# Patient Record
Sex: MALE | Race: WHITE
[De-identification: names, ages, dates, MRNs, and addresses within clinical notes are randomized per-mention and may not be internally consistent; named-entity substitution may affect disease eponyms.]

---

## 2019-12-04 ENCOUNTER — HOSPITAL ENCOUNTER (EMERGENCY)
Dept: HOSPITAL 62 - ER | Age: 19
LOS: 1 days | Discharge: HOME | End: 2019-12-05
Payer: COMMERCIAL

## 2019-12-04 DIAGNOSIS — Z79.899: ICD-10-CM

## 2019-12-04 DIAGNOSIS — F15.10: ICD-10-CM

## 2019-12-04 DIAGNOSIS — T40.1X1A: Primary | ICD-10-CM

## 2019-12-04 DIAGNOSIS — F17.200: ICD-10-CM

## 2019-12-04 PROCEDURE — 36415 COLL VENOUS BLD VENIPUNCTURE: CPT

## 2019-12-04 PROCEDURE — 81001 URINALYSIS AUTO W/SCOPE: CPT

## 2019-12-04 PROCEDURE — 82550 ASSAY OF CK (CPK): CPT

## 2019-12-04 PROCEDURE — 84484 ASSAY OF TROPONIN QUANT: CPT

## 2019-12-04 PROCEDURE — 85025 COMPLETE CBC W/AUTO DIFF WBC: CPT

## 2019-12-04 PROCEDURE — 82553 CREATINE MB FRACTION: CPT

## 2019-12-04 PROCEDURE — 93005 ELECTROCARDIOGRAM TRACING: CPT

## 2019-12-04 PROCEDURE — 99284 EMERGENCY DEPT VISIT MOD MDM: CPT

## 2019-12-04 PROCEDURE — 80053 COMPREHEN METABOLIC PANEL: CPT

## 2019-12-04 PROCEDURE — 93010 ELECTROCARDIOGRAM REPORT: CPT

## 2019-12-05 VITALS — SYSTOLIC BLOOD PRESSURE: 140 MMHG | DIASTOLIC BLOOD PRESSURE: 88 MMHG

## 2019-12-05 LAB
ADD MANUAL DIFF: NO
ALBUMIN SERPL-MCNC: 4.6 G/DL (ref 3.5–5)
ALP SERPL-CCNC: 69 U/L (ref 38–126)
ANION GAP SERPL CALC-SCNC: 10 MMOL/L (ref 5–19)
APPEARANCE UR: (no result)
APTT PPP: YELLOW S
AST SERPL-CCNC: 50 U/L (ref 17–59)
BASOPHILS # BLD AUTO: 0 10^3/UL (ref 0–0.2)
BASOPHILS NFR BLD AUTO: 0.4 % (ref 0–2)
BILIRUB DIRECT SERPL-MCNC: 0.1 MG/DL (ref 0–0.4)
BILIRUB SERPL-MCNC: 0.9 MG/DL (ref 0.2–1.3)
BILIRUB UR QL STRIP: NEGATIVE
BUN SERPL-MCNC: 16 MG/DL (ref 7–20)
CALCIUM: 9.2 MG/DL (ref 8.4–10.2)
CHLORIDE SERPL-SCNC: 102 MMOL/L (ref 98–107)
CK MB SERPL-MCNC: 1.97 NG/ML (ref ?–4.55)
CK SERPL-CCNC: 316 U/L (ref 55–170)
CO2 SERPL-SCNC: 28 MMOL/L (ref 22–30)
EOSINOPHIL # BLD AUTO: 0.1 10^3/UL (ref 0–0.6)
EOSINOPHIL NFR BLD AUTO: 1.3 % (ref 0–6)
ERYTHROCYTE [DISTWIDTH] IN BLOOD BY AUTOMATED COUNT: 12.8 % (ref 11.5–14)
GLUCOSE SERPL-MCNC: 76 MG/DL (ref 75–110)
GLUCOSE UR STRIP-MCNC: NEGATIVE MG/DL
HCT VFR BLD CALC: 43.8 % (ref 37.9–51)
HGB BLD-MCNC: 14.9 G/DL (ref 13.5–17)
KETONES UR STRIP-MCNC: NEGATIVE MG/DL
LYMPHOCYTES # BLD AUTO: 2.4 10^3/UL (ref 0.5–4.7)
LYMPHOCYTES NFR BLD AUTO: 24 % (ref 13–45)
MCH RBC QN AUTO: 30.8 PG (ref 27–33.4)
MCHC RBC AUTO-ENTMCNC: 34.1 G/DL (ref 32–36)
MCV RBC AUTO: 90 FL (ref 80–97)
MONOCYTES # BLD AUTO: 0.8 10^3/UL (ref 0.1–1.4)
MONOCYTES NFR BLD AUTO: 8.5 % (ref 3–13)
NEUTROPHILS # BLD AUTO: 6.4 10^3/UL (ref 1.7–8.2)
NEUTS SEG NFR BLD AUTO: 65.8 % (ref 42–78)
NITRITE UR QL STRIP: NEGATIVE
PH UR STRIP: 5 [PH] (ref 5–9)
PLATELET # BLD: 215 10^3/UL (ref 150–450)
POTASSIUM SERPL-SCNC: 4.5 MMOL/L (ref 3.6–5)
PROT SERPL-MCNC: 7.4 G/DL (ref 6.3–8.2)
PROT UR STRIP-MCNC: 30 MG/DL
RBC # BLD AUTO: 4.85 10^6/UL (ref 4.35–5.55)
SP GR UR STRIP: 1.01
TOTAL CELLS COUNTED % (AUTO): 100 %
TROPONIN I SERPL-MCNC: < 0.012 NG/ML
UROBILINOGEN UR-MCNC: NEGATIVE MG/DL (ref ?–2)
WBC # BLD AUTO: 9.8 10^3/UL (ref 4–10.5)

## 2019-12-05 NOTE — ER DOCUMENT REPORT
ED Substance Abuse / Acc. OD





- General


Chief Complaint: Overdose


Stated Complaint: OVERDOSE


Time Seen by Provider: 12/05/19 00:05


Primary Care Provider: 


MARY MENDOZA MD [Primary Care Provider] - Follow up as needed


Notes: 





Patient is a 19-year-old male who was brought in by EMS after being given 2.4 mg

of Narcan to get him breathing and awake again.  Patient states that he did 

methamphetamines and also did a line of heroin.  States that he has been trying 

to stop lately.  He is not suicidal or homicidal.  He does not remember what 

happened except for waking up in an ambulance.  He denies any pain and states 

that he would like to leave as he has to work in the morning.


TRAVEL OUTSIDE OF THE [place] IN LAST 30 DAYS: No





- HPI


Patient complains to provider of: Accidental overdose, Substance abuse


Onset: Just prior to arrival


Quality of pain: No pain


Severity: None


Pain Level: Denies


Overdose of: Other - Heroin





- Related Data


Home Medications: XANAX.  WELLBUTRIN.  ZOLOFT





Past Medical History





- Social History


Smoking Status: Current Every Day Smoker


Frequency of alcohol use: Heavy


Drug Abuse: Heroin, Methamphetamine


Lives with: Family


Family History: Reviewed & Not Pertinent


Patient has suicidal ideation: No


Patient has homicidal ideation: No





- Medical History


Medical History: Negative


Surgical Hx: Negative





Review of Systems





- Review of Systems


Constitutional: No symptoms reported


EENT: No symptoms reported


Cardiovascular: No symptoms reported


Respiratory: No symptoms reported


Gastrointestinal: No symptoms reported


Genitourinary: No symptoms reported


Male Genitourinary: No symptoms reported


Musculoskeletal: No symptoms reported


Skin: No symptoms reported


Hematologic/Lymphatic: No symptoms reported


Neurological/Psychological: No symptoms reported





Physical Exam





- Vital signs


Vitals: 


                                        











Temp Pulse Resp BP Pulse Ox


 


 98.4 F   89   95 H  144/76 H  94 


 


 12/04/19 23:24  12/04/19 23:24  12/04/19 23:24  12/04/19 23:24  12/04/19 23:24











Interpretation: Normal





- General


General appearance: Appears well, Alert





- HEENT


Head: Normocephalic


Eyes: Normal


Pupils: PERRL


Nasal: Other - Dried blood bilateral nares from nasal trumpet.  No active 

bleeding





- Respiratory


Respiratory status: No respiratory distress


Chest status: Nontender


Breath sounds: Normal


Chest palpation: Normal





- Cardiovascular


Rhythm: Regular


Heart sounds: Normal auscultation


Murmur: No





- Abdominal


Inspection: Normal


Distension: No distension


Bowel sounds: Normal


Tenderness: Nontender


Organomegaly: No organomegaly





- Back


Back: Normal, Nontender





- Extremities


General upper extremity: Normal inspection, Nontender, Normal color, Normal ROM,

Normal temperature


General lower extremity: Normal inspection, Nontender, Normal color, Normal ROM,

Normal temperature, Normal weight bearing.  No: Carlos A's sign





- Neurological


Neuro grossly intact: Yes


Cognition: Normal


Orientation: AAOx4


[place] Coma Scale Eye Opening: Spontaneous


[place] Coma Scale Verbal: Oriented


[place] Coma Scale Motor: Obeys Commands


[place] Coma Scale Total: 15


Speech: Normal


Motor strength normal: LUE, RUE, LLE, RLE


Sensory: Normal





- Psychological


Associated symptoms: Normal affect, Normal mood





- Skin


Skin Temperature: Warm


Skin Moisture: Dry


Skin Color: Normal





Course





- Re-evaluation


Re-evalutation: 





12/05/19 


Patient is a 19-year-old male who overdose on heroin prior to arrival.  He is 

not suicidal or homicidal.  States that he is trying to quit and that he has 

Narcan at home.  He is requesting to be discharged because he has to go to work 

in the morning.  Stable for discharge.  Return if any worsening or concerning 

symptoms.  Hopefully this patient will get the help that he needs.








- Vital Signs


Vital signs: 


                                        











Temp Pulse Resp BP Pulse Ox


 


 98.4 F   113 H  22 H  140/88 H  96 


 


 12/05/19 01:18  12/05/19 01:18  12/05/19 01:18  12/05/19 01:18  12/05/19 01:18














- Laboratory


Result Diagrams: 


                                 12/04/19 00:13





                                 12/04/19 00:13


Laboratory results interpreted by me: 


                                        











  12/04/19 12/05/19





  00:13 00:13


 


Creatine Kinase  316 H 


 


Urine Protein   30 H


 


Urine Blood   SMALL H














Discharge





- Discharge


Clinical Impression: 


Overdose of heroin


Qualifiers:


 Encounter type: initial encounter Injury intent: accidental or unintentional 

Qualified Code(s): T40.1X1A - Poisoning by heroin, accidental (unintentional), 

initial encounter





Condition: Stable


Disposition: HOME, SELF-CARE


Instructions:  Instructions for Home Care Following a Drug Overdose ([facility]), 

Overdose ([facility])


Prescriptions: 


Naloxone HCl [Narcan] 4 mg NS DAILY #2 spray


Referrals: 


MARY MENDOZA MD [Primary Care Provider] - Follow up as needed